# Patient Record
Sex: FEMALE | Race: WHITE | NOT HISPANIC OR LATINO
[De-identification: names, ages, dates, MRNs, and addresses within clinical notes are randomized per-mention and may not be internally consistent; named-entity substitution may affect disease eponyms.]

---

## 2018-04-04 ENCOUNTER — RESULT REVIEW (OUTPATIENT)
Age: 28
End: 2018-04-04

## 2019-09-17 ENCOUNTER — RESULT REVIEW (OUTPATIENT)
Age: 29
End: 2019-09-17

## 2021-02-22 ENCOUNTER — RESULT REVIEW (OUTPATIENT)
Age: 31
End: 2021-02-22

## 2021-03-16 ENCOUNTER — RESULT REVIEW (OUTPATIENT)
Age: 31
End: 2021-03-16

## 2021-08-18 ENCOUNTER — EMERGENCY (EMERGENCY)
Facility: HOSPITAL | Age: 31
LOS: 1 days | Discharge: HOME | End: 2021-08-18
Attending: EMERGENCY MEDICINE
Payer: COMMERCIAL

## 2021-08-18 VITALS
OXYGEN SATURATION: 98 % | WEIGHT: 149.91 LBS | SYSTOLIC BLOOD PRESSURE: 104 MMHG | RESPIRATION RATE: 18 BRPM | TEMPERATURE: 98 F | DIASTOLIC BLOOD PRESSURE: 65 MMHG | HEIGHT: 65 IN | HEART RATE: 99 BPM

## 2021-08-18 PROCEDURE — 73630 X-RAY EXAM OF FOOT: CPT

## 2021-08-18 PROCEDURE — 73610 X-RAY EXAM OF ANKLE: CPT | Mod: 26,LT

## 2021-08-18 PROCEDURE — 99284 EMERGENCY DEPT VISIT MOD MDM: CPT | Mod: 25

## 2021-08-18 PROCEDURE — 99283 EMERGENCY DEPT VISIT LOW MDM: CPT

## 2021-08-18 PROCEDURE — 73630 X-RAY EXAM OF FOOT: CPT | Mod: 26,LT

## 2021-08-18 PROCEDURE — 73610 X-RAY EXAM OF ANKLE: CPT

## 2021-08-18 RX ORDER — ACETAMINOPHEN 500 MG
650 TABLET ORAL ONCE
Refills: 0 | Status: COMPLETED | OUTPATIENT
Start: 2021-08-18 | End: 2021-08-18

## 2021-08-18 RX ADMIN — Medication 650 MILLIGRAM(S): at 21:46

## 2021-08-18 NOTE — ED PROVIDER NOTE - ATTENDING CONTRIBUTION TO CARE
Hans: I performed a face to face bedside interview with patient regarding history of present illness, review of symptoms and past medical history. I completed an independent physical exam.  I have discussed patient's plan of care with advanced care provider.   I agree with note as stated above including HISTORY OF PRESENT ILLNESS, HIV, PAST MEDICAL/SURGICAL/FAMILY/SOCIAL HISTORY, ALLERGIES AND HOME MEDICATIONS, REVIEW OF SYSTEMS, PHYSICAL EXAM, MEDICAL DECISION MAKING and any PROGRESS NOTES during the time I functioned as the attending physician for this patient  unless otherwise noted. My brief assessment is as follows: left lateral malleolus pain s/p injury while playing kickball. no fibula pain. no tingling/numbness, no other complaints. ttp lateral mal, neurovasuclarly intact. xray, supportive care.

## 2021-08-18 NOTE — ED ADULT NURSE NOTE - OBJECTIVE STATEMENT
assumed care of pt at 21:50. pt states she was playing softball rolled her left ankle and heard 4 cracks. unable to wear weight on left ankle. unable to visualize left ankle, wrapped in ace bandage. rr even and unlabored. pt educated on plan of care, pt able to successfully teach back plan of care to RN, RN will continue to reeducate pt during hospital stay.

## 2021-08-18 NOTE — ED PROVIDER NOTE - WORK/EXCUSE FORM DATE
SPOKE TO PT'S  AND HE SAID THEY TOOK TWO RX ONE FOR MORPHINE AND ANOTHER FOR OXY TO THE PHARMACY YESTERDAY.     18-Aug-2021

## 2021-08-18 NOTE — ED PROVIDER NOTE - OBJECTIVE STATEMENT
31 year old female with no pmhx presents c/o ankle pain. Pt states she was running playing kickball when she tripped forward onto her ankle. She heard a "crack", has not been able to ambulate since and admits to pain. she denies numbness, tingling, weakness, loc, head trauma, fever/chills.

## 2021-08-18 NOTE — ED PROVIDER NOTE - NSFOLLOWUPINSTRUCTIONS_ED_ALL_ED_FT
Follow up with Orthopedics within 1 week   Follow up with PCP within 1-2 days   take tylenol every 6-8 hours for pain   Ice extremity   elevate as much as possible   keep air cast in place until orthopedic follow up     Sprain    A sprain is a stretch or tear in one of the tough, fiber-like tissues (ligaments) in your body. This is caused by an injury to the area such as a twisting mechanism. Symptoms include pain, swelling, or bruising. Rest that area over the next several days and slowly resume activity when tolerated. Ice can help with swelling and pain.     SEEK IMMEDIATE MEDICAL CARE IF YOU HAVE ANY OF THE FOLLOWING SYMPTOMS: worsening pain, inability to move that body part, numbness or tingling.

## 2021-08-18 NOTE — ED PROVIDER NOTE - CARE PROVIDER_API CALL
Haroldo Velázquez (DO)  Orthopedics  [address] [place] [id number]  Phone: [phone]  Fax: [phone]  Follow Up Time:

## 2021-08-18 NOTE — ED PROVIDER NOTE - PATIENT PORTAL LINK FT
You can access the FollowMyHealth Patient Portal offered by [facility] by registering at the following website: [email or web address]. By joining [employer]’s FollowMyHealth portal, you will also be able to view your health information using other applications (apps) compatible with our system.

## 2021-08-25 PROBLEM — Z00.00 ENCOUNTER FOR PREVENTIVE HEALTH EXAMINATION: Status: ACTIVE | Noted: 2021-08-25

## 2021-09-02 ENCOUNTER — APPOINTMENT (OUTPATIENT)
Dept: ORTHOPEDIC SURGERY | Facility: CLINIC | Age: 31
End: 2021-09-02
Payer: COMMERCIAL

## 2021-09-02 VITALS
DIASTOLIC BLOOD PRESSURE: 59 MMHG | HEART RATE: 84 BPM | SYSTOLIC BLOOD PRESSURE: 106 MMHG | BODY MASS INDEX: 26.66 KG/M2 | WEIGHT: 160 LBS | HEIGHT: 65 IN

## 2021-09-02 DIAGNOSIS — Z78.9 OTHER SPECIFIED HEALTH STATUS: ICD-10-CM

## 2021-09-02 DIAGNOSIS — S93.402A SPRAIN OF UNSPECIFIED LIGAMENT OF LEFT ANKLE, INITIAL ENCOUNTER: ICD-10-CM

## 2021-09-02 PROCEDURE — 99203 OFFICE O/P NEW LOW 30 MIN: CPT

## 2021-09-02 NOTE — DISCUSSION/SUMMARY
[de-identified] : Assessment: 31-year-old female with a left ankle sprain\par \par Plan: I had a long discussion with the patient today regarding the nature of their diagnosis and treatment plan. We discussed the risks and benefits of no treatment as well as nonoperative and operative treatments.  I reviewed the patient's x-rays today with her in the office which are negative for any acute pathology.  Her exam is consistent with an ankle sprain.  At this time I recommend conservative treatment of the patient's condition with modalities including rest, ice, heat, anti-inflammatory medications, activity modifications, and home stretching and strengthening exercises daily. I discussed with the patient the risks and benefits associated with NSAID use.  She is advised to ice and elevate the ankle frequently.  She was provided with a home exercise program and a Thera-Band today in the office to begin working on gentle range of motion and proprioceptive exercises.  She was also provided with an [place] ankle brace today in the office for stability with walking.  She may progress to weightbearing as tolerated without crutches with use of the brace for assistance once she is comfortable.  If she continues to have pain over the next 4 weeks she will return to the office for further evaluation.\par \par The patient verbalizes their understanding and agrees with the plan.  All questions were answered to their satisfaction.

## 2021-09-02 NOTE — HISTORY OF PRESENT ILLNESS
[de-identified] : Brielle is a pleasant 31-year-old female who presents to the office today complaining of a left ankle sprain.  The patient states that she was playing kickball over the weekend and twisted her left ankle after running.  She noticed immediate pain and swelling and was unable to bear weight.  She is brought to the emergency department at [facility] where x-rays were taken and were negative for fracture.  She was told that she had an ankle sprain and she was told to follow-up with me as an outpatient.  Currently she is feeling better and is able to bear weight.  She has been wearing an Aircast.  She has been icing and elevating the ankle.  The patient denies any fevers, chills, sweats, recent illnesses, numbness, tingling, weakness, or pain elsewhere at this time.

## 2021-09-02 NOTE — PHYSICAL EXAM
[de-identified] : General:\par Awake, alert, no acute distress, Patient was cooperative and appropriate during the examination.\par \par The patient's weight is of normal weight for height and age.\par \par Patient presents in an Aircast on her left lower extremity with use of crutches for assistance with ambulation.\par \par Full, painless range of motion of the neck and back.\par \par Exam of the bilateral lower extremities is intact and symmetric with regards to dermatologic, vascular, and neurologic exam. Bilateral lower extremity sensation is grossly intact to light touch in the DP/SP/T/S/S nerve distributions. Intact DF/PF/EHL. BIlateral lower extremity warm and well-perfused with brisk capillary refill.\par \par Pulmonary:\par Regular, nonlabored breathing\par \par Abdomen:\par Soft, nontender, nondistended.\par \par Lymphatic:\par No evidence of inguinal lymphadenopathy\par \par \par \par Left ankle Examination:\par Physical examination of the ankle is negative for any abrasions or ulcerations. There is moderate soft-tissue swelling about the ankle with developing ecchymosis.  No erythema, warmth, or signs of infection.\par \par Sensation is intact to light touch L2-S1\par Palpable DP/PT pulse\par EHL/FHL/TA/GSC motor function intact\par \par Range of Motion:\par Dorsiflexion 10 degrees\par Plantarflexion 30 degrees\par Inversion 30 degrees with pain\par Eversion 20 degrees\par \par Strength Testing\par Dorsiflexion 5/5\par Plantarflexion 5/5\par Inversion 5/5\par Eversion 5/5\par \par Palpation\par Mildly tender to palpation over the lateral malleolus\par Not tender to palpation over the medial malleolus\par Moderately tender to palpation over the ATFL and CFL\par Not tender to palpation over the PTFL\par Not tender to palpation over the calcaneal tuberosity\par Not tender to palpation over the peroneal tendons\par Not tender to palpation over the tarsals, metatarsals, or phalanges\par No palpable defect within the achilles tendon\par \par Special Tests:\par Ankle anterior drawer positive for pain, no instability\par Squeeze test negative\par Puente's test negative [de-identified] : X-rays including multiple views of the left ankle from the hospital reviewed the patient today in the office.  There is no acute fracture or dislocation.  There is no arthritis.

## 2022-11-17 ENCOUNTER — RESULT REVIEW (OUTPATIENT)
Age: 32
End: 2022-11-17

## 2022-12-07 ENCOUNTER — RESULT REVIEW (OUTPATIENT)
Age: 32
End: 2022-12-07

## 2023-03-20 ENCOUNTER — APPOINTMENT (OUTPATIENT)
Dept: NEUROLOGY | Facility: CLINIC | Age: 33
End: 2023-03-20

## 2023-10-13 NOTE — ED ADULT TRIAGE NOTE - CCCP TRG CHIEF CMPLNT
Recent Visits  Date Type Provider Dept   09/11/23 Office Visit Iva Corrales, DO [facility]   03/02/23 Office Visit Iva Corrales, DO [facility]   08/26/22 Office Visit Iva Corrales, DO [facility]   Showing recent visits within past 540 days with a meds authorizing provider and meeting all other requirements  Future Appointments  No visits were found meeting these conditions.   Showing future appointments within next 150 days with a meds authorizing provider and meeting all other requirements ankle pain/injury